# Patient Record
Sex: FEMALE | Race: WHITE | Employment: OTHER | URBAN - NONMETROPOLITAN AREA
[De-identification: names, ages, dates, MRNs, and addresses within clinical notes are randomized per-mention and may not be internally consistent; named-entity substitution may affect disease eponyms.]

---

## 2022-05-26 ENCOUNTER — OFFICE VISIT (OUTPATIENT)
Dept: PRIMARY CARE CLINIC | Age: 87
End: 2022-05-26
Payer: MEDICARE

## 2022-05-26 VITALS
SYSTOLIC BLOOD PRESSURE: 153 MMHG | WEIGHT: 140 LBS | OXYGEN SATURATION: 96 % | TEMPERATURE: 99.2 F | RESPIRATION RATE: 18 BRPM | HEART RATE: 92 BPM | DIASTOLIC BLOOD PRESSURE: 82 MMHG | BODY MASS INDEX: 23.32 KG/M2 | HEIGHT: 65 IN

## 2022-05-26 DIAGNOSIS — J40 BRONCHITIS WITH ACUTE WHEEZING: Primary | ICD-10-CM

## 2022-05-26 DIAGNOSIS — R05.9 COUGH: ICD-10-CM

## 2022-05-26 PROBLEM — I10 HYPERTENSION: Status: ACTIVE | Noted: 2022-05-26

## 2022-05-26 PROBLEM — J43.2 CENTRILOBULAR EMPHYSEMA (HCC): Status: ACTIVE | Noted: 2022-05-26

## 2022-05-26 PROBLEM — I25.10 ARTERIOSCLEROSIS OF CORONARY ARTERY: Status: ACTIVE | Noted: 2022-05-26

## 2022-05-26 PROBLEM — J30.1 ALLERGIC RHINITIS DUE TO POLLEN: Status: ACTIVE | Noted: 2022-05-26

## 2022-05-26 PROBLEM — J32.9 SINUSITIS: Status: ACTIVE | Noted: 2022-05-26

## 2022-05-26 PROBLEM — K27.9 PEPTIC ULCER: Status: ACTIVE | Noted: 2022-05-26

## 2022-05-26 PROBLEM — R91.8 MULTIPLE PULMONARY NODULES: Status: ACTIVE | Noted: 2022-05-26

## 2022-05-26 LAB
INFLUENZA A ANTIBODY: NEGATIVE
INFLUENZA B ANTIBODY: NEGATIVE

## 2022-05-26 PROCEDURE — 4004F PT TOBACCO SCREEN RCVD TLK: CPT | Performed by: NURSE PRACTITIONER

## 2022-05-26 PROCEDURE — 1123F ACP DISCUSS/DSCN MKR DOCD: CPT | Performed by: NURSE PRACTITIONER

## 2022-05-26 PROCEDURE — G8400 PT W/DXA NO RESULTS DOC: HCPCS | Performed by: NURSE PRACTITIONER

## 2022-05-26 PROCEDURE — 99202 OFFICE O/P NEW SF 15 MIN: CPT | Performed by: NURSE PRACTITIONER

## 2022-05-26 PROCEDURE — 87804 INFLUENZA ASSAY W/OPTIC: CPT | Performed by: NURSE PRACTITIONER

## 2022-05-26 PROCEDURE — 1090F PRES/ABSN URINE INCON ASSESS: CPT | Performed by: NURSE PRACTITIONER

## 2022-05-26 PROCEDURE — G8427 DOCREV CUR MEDS BY ELIG CLIN: HCPCS | Performed by: NURSE PRACTITIONER

## 2022-05-26 PROCEDURE — G8420 CALC BMI NORM PARAMETERS: HCPCS | Performed by: NURSE PRACTITIONER

## 2022-05-26 RX ORDER — PREDNISONE 20 MG/1
TABLET ORAL
Qty: 10 TABLET | Refills: 0 | Status: SHIPPED | OUTPATIENT
Start: 2022-05-26

## 2022-05-26 RX ORDER — DOXYCYCLINE HYCLATE 100 MG
100 TABLET ORAL 2 TIMES DAILY
Qty: 14 TABLET | Refills: 0 | Status: SHIPPED | OUTPATIENT
Start: 2022-05-26 | End: 2022-06-02

## 2022-05-26 RX ORDER — ONDANSETRON 4 MG/1
4 TABLET, FILM COATED ORAL
COMMUNITY
Start: 2020-04-06

## 2022-05-26 RX ORDER — CHLORTHALIDONE 25 MG/1
TABLET ORAL
COMMUNITY
Start: 2022-05-11

## 2022-05-26 RX ORDER — BENZONATATE 100 MG/1
100 CAPSULE ORAL 3 TIMES DAILY PRN
Qty: 21 CAPSULE | Refills: 0 | Status: SHIPPED | OUTPATIENT
Start: 2022-05-26 | End: 2022-06-02

## 2022-05-26 RX ORDER — LEVOTHYROXINE SODIUM 0.1 MG/1
TABLET ORAL
COMMUNITY
Start: 2022-04-26

## 2022-05-26 RX ORDER — OMEPRAZOLE 40 MG/1
40 CAPSULE, DELAYED RELEASE ORAL
COMMUNITY
Start: 2020-03-31

## 2022-05-26 RX ORDER — SUCRALFATE 1 G/1
TABLET ORAL
COMMUNITY
Start: 2022-03-10

## 2022-05-26 RX ORDER — CLOPIDOGREL BISULFATE 75 MG/1
TABLET ORAL
COMMUNITY
Start: 2022-03-10

## 2022-05-26 ASSESSMENT — ENCOUNTER SYMPTOMS
SHORTNESS OF BREATH: 0
DIARRHEA: 0
NAUSEA: 1
VOMITING: 1
COUGH: 1
RHINORRHEA: 1
WHEEZING: 0
SORE THROAT: 1

## 2022-05-26 NOTE — PATIENT INSTRUCTIONS
Patient Education        Bronchitis: Care Instructions  Your Care Instructions     Bronchitis is inflammation of the bronchial tubes, which carry air to the lungs. The tubes swell and produce mucus, or phlegm. The mucus and inflamedbronchial tubes make you cough. You may have trouble breathing. Most cases of bronchitis are caused by viruses like those that cause colds. Antibiotics usually do not help and they may be harmful. Bronchitis usually develops rapidly and lasts about 2 to 3 weeks in otherwisehealthy people. Follow-up care is a key part of your treatment and safety. Be sure to make and go to all appointments, and call your doctor if you are having problems. It's also a good idea to know your test results and keep alist of the medicines you take. How can you care for yourself at home?  Take all medicines exactly as prescribed. Call your doctor if you think you are having a problem with your medicine.  Get some extra rest.   Take an over-the-counter pain medicine, such as acetaminophen (Tylenol), ibuprofen (Advil, Motrin), or naproxen (Aleve) to reduce fever and relieve body aches. Read and follow all instructions on the label.  Do not take two or more pain medicines at the same time unless the doctor told you to. Many pain medicines have acetaminophen, which is Tylenol. Too much acetaminophen (Tylenol) can be harmful.  Take an over-the-counter cough medicine to help quiet a dry, hacking cough so that you can sleep. Avoid cough medicines that have more than one active ingredient. Read and follow all instructions on the label.  Do not smoke. Smoking can make bronchitis worse. If you need help quitting, talk to your doctor about stop-smoking programs and medicines. These can increase your chances of quitting for good. When should you call for help? Call 911 anytime you think you may need emergency care. For example, call if:     You have severe trouble breathing.    Call your doctor now or seek immediate medical care if:     You have new or worse trouble breathing.      You cough up dark brown or bloody mucus (sputum).      You have a new or higher fever.      You have a new rash. Watch closely for changes in your health, and be sure to contact your doctor if:     You cough more deeply or more often, especially if you notice more mucus or a change in the color of your mucus.      You are not getting better as expected. Where can you learn more? Go to https://[employer]. org and sign in to your [employer] account. Enter H333 in the Folk[employer] box to learn more about \"Bronchitis: Care Instructions. \"     If you do not have an account, please click on the \"Sign Up Now\" link. Current as of: July 6, 2021               Content Version: 13.2  © 2006-2022 Healthwise, Incorporated. Care instructions adapted under license by [facility] ([facility]). If you have questions about a medical condition or this instruction, always ask your healthcare professional. [address] any warranty or liability for your use of this information. Patient Education        doxycycline (oral/injection)  Pronunciation: DOX evelyne valente  Brand:  Acticlate, Adoxa, Alodox, Avidoxy, Doryx, Mondoxyne NL, Monodox, Morgidox,Okebo, Oracea, Oraxyl, Targadox, Vibramycin  What is the most important information I should know about doxycycline? You should not take this medicine if you are allergic to any tetracyclineantibiotic. Children younger than 6years old should use doxycycline only in cases of severe or life-threatening conditions. This medicine can cause permanentyellowing or graying of the teeth in children  Using doxycycline during pregnancy could harm the unborn baby or cause permanent tooth discoloration later in the baby's life. What is doxycycline?   Doxycycline is a tetracycline antibiotic that  Doxycycline is used to treat many different bacterial infections, such as acne, urinary tract infections, intestinal infections, eye infections, gonorrhea,chlamydia, periodontitis (gum disease), and others. Doxycycline is also used to treat blemishes, bumps, and acne-like lesionscaused by rosacea. Doxycycline will not treat facial redness caused by rosacea. Some forms of doxycycline are used to prevent malaria, to treat anthrax, or totreat infections caused by mites, ticks, or lice. Doxycycline may also be used for purposes not listed in this medication guide. What should I discuss with my healthcare provider before taking doxycycline? You should not take this medicine if you are allergic to doxycycline or other tetracycline antibiotics such as demeclocycline, minocycline, tetracycline, ortigecycline. Tell your doctor if you have ever had:   liver disease;   kidney disease;   asthma or sulfite allergy;   increased pressure inside your skull; or   if you also take isotretinoin, seizure medicine, or a blood thinner such as warfarin (Coumadin). If you are using doxycycline to treat gonorrhea, your doctor may test you to make sure you do not also have syphilis, anothersexually transmitted disease. Taking this medicine during pregnancy may affect tooth and bone development in the unborn baby. Taking doxycycline during the last half of pregnancy can cause permanent tooth discoloration later in the baby's life. Tell your doctor if you are pregnant orif you become pregnant. Doxycycline can make birth control pills less effective. Ask your doctor about using a non-hormonal birth control (condom, diaphragm with spermicide) toprevent pregnancy. Doxycycline can pass into breast milk and may affect bone and tooth development in a nursing infant. Do not breastfeed while you are taking doxycycline. Doxycycline can cause permanent yellowing or graying of the teeth in children younger than 6years old.  Children should use doxycycline only in cases of severe or life-threatening conditions such as anthrax or [facility] spotted fever. The benefit of treating a serious condition may outweigh any risks tothe child's tooth development. How should I take doxycycline? Follow all directions on your prescription label and read all medication guidesor instruction sheets. Use the medicine exactly as directed. Take doxycycline with a full glass of water. Drink plenty of liquids while youare taking doxycycline. Read and carefully follow any Instructions for Use provided with your medicine. Ask your doctor or pharmacist if you do not understand these instructions. Most brands of doxycyline may be taken with food or milk if the medicine upsetsyour stomach. Different brands of doxycycline may have different instructions about taking them with or without food. Take Oracea on an empty stomach, at least 1 hour before or 2 hours after a meal.  You may need to split a doxycycline tablet to get the correct dose. Follow yourdoctor's instructions. Swallow a delayed-release capsule or tablet whole. Do not crush, chew, break, or open it. Measure liquid medicine  with the dosing syringe provided, or with a special dose-measuring spoon or medicine cup. If you do not have a dose-measuring device, ask your pharmacistfor one. If you take doxycycline to prevent malaria: Start taking the medicine 1 or 2 days before entering an area where malaria is common. Continue taking the medicine every day during your stay and for atleast 4 weeks after you leave the area. Doxycycline is usually given by injection only if you are unable to take the medicine by mouth. A healthcare providerwill give you this injection as an infusion into a vein. Use this medicine for the full prescribed length of time, even if your symptoms quickly improve. Skipping doses can increase your risk of infection that is resistant to medication. Doxycycline will not treat a viral infection such asthe flu or a common cold.   Store at room temperature away from moisture, heat, and light. Throw away any unused medicine after the expiration date on the label has passed. Using  doxycycline can cause damage to your kidneys. What happens if I miss a dose? Take the medicine as soon as you can, but skip the missed dose if it is almost time for your next dose. Do not take two doses at one time. What happens if I overdose? Seek emergency medical attention or call the Poison Help line at 1-658.197.8379. What should I avoid while taking doxycycline? Do not take iron supplements, multivitamins, calcium supplements, antacids, orlaxatives within 2 hours before or after taking doxycycline. Avoid taking any other antibiotics with doxycycline unless your doctor has told you to. Doxycycline could make you sunburn more easily. Avoid sunlight or tanning beds. Wear protective clothing and use sunscreen (SPF 30 or higher) when you areoutdoors. Antibiotic medicines can cause diarrhea, which may be a sign of a new infection. If you have diarrhea that is watery or bloody, call your doctor. Do not use anti-diarrhea medicine unless your doctor tells you to. What are the possible side effects of doxycycline? Get emergency medical help if you have signs of an allergic reaction (hives, difficult breathing, swelling in your face or throat) or a severe skin reaction (fever, sore throat, burning in your eyes, skin pain, red or purple skin rashthat spreads and causes blistering and peeling). Seek medical treatment if you have a serious drug reaction that can affect many parts of your body. Symptoms may include: skin rash, fever, swollen glands, flu-like symptoms, muscle aches, severe weakness, unusual bruising, or yellowing of your skin oreyes. This reaction may occur several weeks after you began using doxycycline.   Call your doctor at once if you have:   severe stomach pain, diarrhea that is watery or bloody;   throat irritation, trouble swallowing;   chest pain, irregular heart rhythm, feeling short of breath;   little or no urination;   low white blood cell counts --fever, chills, swollen glands, body aches, weakness, pale skin, easy bruising or bleeding;   increased pressure inside the skull --severe headaches, ringing in your ears, dizziness, nausea, vision problems, pain behind your eyes; or   signs of liver or pancreas problems --loss of appetite, upper stomach pain (that may spread to your back), tiredness, nausea or vomiting, fast heart rate, dark urine, jaundice (yellowing of the skin or eyes). Common side effects may include:   nausea, vomiting, upset stomach, loss of appetite;   mild diarrhea;   skin rash or itching;   darkened skin color; or   vaginal itching or discharge. This is not a complete list of side effects and others may occur. Call your doctor for medical advice about side effects. You may report side effects toFDA at 7-479-SKU-1723. What other drugs will affect doxycycline? Sometimes it is not safe to use certain medications at the same time. Some drugs can affect your blood levels of other drugs you take, which mayincrease side effects or make the medications less effective. Other drugs may affect doxycycline, including prescription and over-the-counter medicines, vitamins, and herbal products. Tell your doctor about all yourcurrent medicines and any medicine you start or stop using. Where can I get more information? Your pharmacist can provide more information about doxycycline. Remember, keep this and all other medicines out of the reach of children, never share your medicines with others, and use this medication only for the indication prescribed. Every effort has been made to ensure that the information provided by [address] is accurate, up-to-date, and complete, but no guarantee is made to that effect. Drug information contained herein may be time sensitive.  [employer] information has been compiled for use by healthcare practitioners and consumers in the [place] and therefore [employer] does not warrant that uses outside of the [place] are appropriate, unless specifically indicated otherwise. [facility] drug information does not endorse drugs, diagnose patients or recommend therapy. [facility] drug information is an informational resource designed to assist licensed healthcare practitioners in caring for their patients and/or to serve consumers viewing this service as a supplement to, and not a substitute for, the expertise, skill, knowledge and judgment of healthcare practitioners. The absence of a warning for a given drug or drug combination in no way should be construed to indicate that the drug or drug combination is safe, effective or appropriate for any given patient. [facility] does not assume any responsibility for any aspect of healthcare administered with the aid of information [facility] provides. The information contained herein is not intended to cover all possible uses, directions, precautions, warnings, drug interactions, allergic reactions, or adverse effects. If you have questions about the drugs you are taking, check with yourdoctor, nurse or pharmacist.  Copyright 7451-1101 167 Delroy Mao: 21.04. Revision date:11/4/2020. Care instructions adapted under license by [facility] ([facility]). If you have questions about a medical condition or this instruction, always ask your healthcare professional. [place] [address] any warranty or liability for your use of this information. Patient Education        prednisone  Pronunciation: PRED ni sone  Brand: Isidro  What is the most important information I should know about prednisone? You should not use prednisone if you have a fungal infection anywhere in yourbody. You should not stop using prednisone suddenly. Follow your doctor's instructions about tapering your dose. What is prednisone?   Prednisone is a steroid that reduces inflammation in the body, and alsosuppresses your immune system. Prednisone is used to treat many different conditions such as hormonal disorders, skin diseases, arthritis, lupus, psoriasis, allergic conditions, ulcerative colitis, Crohn's disease, eye diseases, lung diseases, asthma, tuberculosis, blood cell disorders, kidney disorders, leukemia, lymphoma, multiple sclerosis, organ transplant rejection, swelling from a brain tumor orinjury. Prednisone may also be used for purposes not listed in this medication guide. What should I discuss with my healthcare provider before taking prednisone? You should not use prednisone if you are allergic to it, or if you have afungal infection anywhere in your body. Steroid medication can weaken your immune system, making it easier for you to get an infection or worsening an infection you already have. Tell your doctor about any illnessor infection you've had within the past several weeks. Tell your doctor if you have ever had:   heart problems, high blood pressure, or a heart attack;   glaucoma or cataracts;   herpes infection of the eyes;   past or present tuberculosis;   a parasite infection that causes diarrhea (such as threadworms);   any illness that causes diarrhea;   underactive thyroid;   diabetes;   a stomach ulcer, diverticulitis;   a colostomy or ileostomy;   osteoporosis or low bone mineral density (steroid medication can increase your risk of bone loss);   low levels of calcium or potassium in your blood;   cirrhosis or other liver disease;   mental illness or psychosis; or   a muscle disorder such as myasthenia gravis. Long-term use of steroids may lead to bone loss (osteoporosis), especially if you smoke or drink alcohol, if you do not exercise, or if you do not get enoughvitamin D or calcium in your diet. It is not known whether this medicine will harm an unborn baby. Tell yourdoctor if you are pregnant or plan to become pregnant.   You should not breastfeed while using prednisone. How should I take prednisone? Follow all directions on your prescription label and read all medication guides or instruction sheets. Your doctor may occasionally change your dose. Use themedicine exactly as directed. Prednisone is taken daily or every other day, depending on the condition being treated. You may need to take the medicine at a certain time of day. Follow your doctor's instructions about when and how often to take this medicine. Take with food if prednisone upsets your stomach. Measure liquid medicine carefully. Use the dosing syringe provided, or use a medicine dose-measuringdevice (not a kitchen spoon). Swallow the delayed-release tablet whole and do not crush, chew, or break it. Prednisone can weaken (suppress) your immune system, and you may get an infection more easily. Call your doctor if you have signs of infection (fever, weakness, cold or flu symptoms, skin sores, diarrhea, frequent or recurringillness). If you have major surgery or a severe injury or infection, your prednisone dose needs may change. Make sure any doctor caring for you knows you are using thismedicine. If you use this medicine long-term, you may need medical tests and vision exams. In case of emergency, wear or carry medical identification to let others knowyou use a steroid. You should not stop using prednisone suddenly. Follow your doctor's instructions about tapering your dose. Store at room temperature away from moisture, heat, and light. What happens if I miss a dose? Take the medicine as soon as you can, but skip the missed dose if it is almost time for your next dose. Do not take two doses at one time. What happens if I overdose? Seek emergency medical attention or call the Poison Help line at 1-795.805.8001.   High doses or long-term use of prednisone can lead to thinning skin, easy bruising, changes in body fat (especially in your face, neck, back, and waist), increased acne or facial hair, menstrualproblems, impotence, or loss of interest in sex. What should I avoid while taking prednisone? Do not receive a \"live\" vaccine while using prednisone. The vaccine may not work as well and may not fully protect you from disease. Live vaccines include measles, mumps, rubella (MMR), polio, rotavirus, typhoid, yellow fever, varicella (chickenpox), zoster (shingles), and nasal flu(influenza) vaccine. Avoid being near people who are sick or have infections. Call your doctor for preventive treatment if you are exposed to chickenpox or measles. These conditions can be serious or even fatal in people who are using steroidmedicine. Avoid drinking alcohol. What are the possible side effects of prednisone? Get emergency medical help if you have signs of an allergic reaction: hives; difficult breathing; swelling of your face, lips, tongue, or throat. Call your doctor at once if you have:   muscle pain or weakness;   blurred vision, tunnel vision, eye pain, or seeing halos around lights;   severe depression, changes in personality, unusual thoughts or behavior;   bloody or tarry stools, coughing up blood or vomit that looks like coffee grounds;   swelling, rapid weight gain, feeling short of breath;   irregular heartbeats;   severe headache, pounding in your neck or ears;   decreased adrenal gland hormones --muscle weakness, tiredness, diarrhea, nausea, menstrual changes, skin discoloration, craving salty foods, and feeling light-headed; or   low potassium level --leg cramps, constipation, irregular heartbeats, fluttering in your chest, increased thirst or urination, numbness or tingling, muscle weakness or limp feeling. Prednisone can affect growth in children. Tell your doctor if your child is notgrowing at a normal rate while using this medicine.   Common side effects may include:   weight gain (especially in your face or your upper back and torso);   increased appetite;   mood changes, trouble sleeping;   changes in your menstrual periods;   problems with memory or thought;   muscle or joint pain;   weakness;   headache, dizziness, spinning sensation;   nausea, bloating, loss of appetite;   slow wound healing; or   acne, increased sweating, thinning skin, bruising, pinpoint spots under your skin. This is not a complete list of side effects and others may occur. Call your doctor for medical advice about side effects. You may report side effects toFDA at 8-406-UDA-4422. What other drugs will affect prednisone? Sometimes it is not safe to use certain medications at the same time. Some drugs can affect your blood levels of other drugs you take, which mayincrease side effects or make the medications less effective. Tell your doctor about all your current medicines. Many drugs can affect prednisone, especially:   bupropion;   cyclosporine;   digoxin;   ketoconazole;   an antibiotic;   birth control pills or hormone replacement therapy;   a diuretic or \"water pill\";   insulin or oral diabetes medicine;   a blood thinner --warfarin, Coumadin, Jantoven; or   NSAIDs (nonsteroidal anti-inflammatory drugs) --aspirin, ibuprofen (Advil, Motrin), naproxen (Aleve), celecoxib, diclofenac, indomethacin, meloxicam, and others. This list is not complete and many other drugs may affect prednisone. This includes prescription and over-the-counter medicines, vitamins, andherbal products. Not all possible drug interactions are listed here. Where can I get more information? Your pharmacist can provide more information about prednisone. Remember, keep this and all other medicines out of the reach of children, never share your medicines with others, and use this medication only for the indication prescribed. Every effort has been made to ensure that the information provided by [address] is accurate, up-to-date, and complete, but no guarantee is made to that effect.  Drug information contained herein may be time sensitive. [employer] information has been compiled for use by healthcare practitioners and consumers in the [place] and therefore [employer] does not warrant that uses outside of the [place] are appropriate, unless specifically indicated otherwise. [employer] drug information does not endorse drugs, diagnose patients or recommend therapy. [facility] drug information is an informational resource designed to assist licensed healthcare practitioners in caring for their patients and/or to serve consumers viewing this service as a supplement to, and not a substitute for, the expertise, skill, knowledge and judgment of healthcare practitioners. The absence of a warning for a given drug or drug combination in no way should be construed to indicate that the drug or drug combination is safe, effective or appropriate for any given patient. [facility] does not assume any responsibility for any aspect of healthcare administered with the aid of information [facility] [employer] provides. The information contained herein is not intended to cover all possible uses, directions, precautions, warnings, drug interactions, allergic reactions, or adverse effects. If you have questions about the drugs you are taking, check with yourdoctor, nurse or pharmacist.  Copyright 1331-6694 [address]. Version: 10.01. Revision date:3/28/2019. Care instructions adapted under license by [facility] ([facility]). If you have questions about a medical condition or this instruction, always ask your healthcare professional. [place] [address] any warranty or liability for your use of this information. Patient Education        benzonatate  Pronunciation: goldie bowles  Brand: Tessalon Perles  What is the most important information I should know about benzonatate? Never suck or chew on a benzonatate capsule. Swallow the pill whole. Sucking or chewing the capsule may cause serious sideeffects.   Benzonatate is not approved for use by anyone younger than 8years old. An overdose of benzonatate can be fatal to a young child. What is benzonatate? Benzonatate is used to relieve coughing. Benzonatate is a non-narcotic cough medicine that numbs the throat and lungs,making the cough reflex less active. Benzonatate may also be used for purposes not listed in this medication guide. What should I discuss with my healthcare provider before taking benzonatate? You should not use this medicine if you are allergic to benzonatate or topical numbing medicines such as tetracaine or procaine (found in some insect bite andsunburn creams). Tell your doctor if you are pregnant or breastfeeding. Benzonatate is not approved for use by anyone younger than 8years old. An overdose of benzonatate can be fatal, especially to a young child who has accidentally swallowed the medicine. How should I take benzonatate? Follow all directions on your prescription label and read all medication guidesor instruction sheets. Use the medicine exactly as directed. Never suck or chew on a benzonatate capsule. Swallow the pill whole. Sucking or chewing the capsule may cause serious sideeffects. Store at room temperature away from moisture, heat, and light. What happens if I miss a dose? Skip the missed dose and use your next dose at the regular time. Do not use two doses at one time. What happens if I overdose? Seek emergency medical attention or call the Poison Help line at 1-350.981.9613. An overdose of benzonatate can be fatal, especially to a child. Accidental death has occurred in children under 8years old. Overdose symptoms may include tremors, feeling restless, seizure (convulsions),slow heart rate, weak pulse, fainting, and slow breathing (breathing may stop). What should I avoid while taking benzonatate? Avoid eating or drinking anything while you feel numbness or tingling in yourmouth or throat.   What are the possible side effects of benzonatate? Stop taking this medicine and get emergency medical help if you have signs of an allergic reaction: hives; difficult breathing; swelling of your face, lips, tongue, or throat. Call your doctor at once if you have:   severe drowsiness or dizziness;   confusion, hallucinations.  ongoing numbness or tingling in your mouth, throat, or face;   numbness in your chest;   a choking feeling;   chills; or   burning in your eyes. Some of these side effects may result from chewing or sucking on a benzonatate capsule. Common side effects may include:   headache, dizziness;   nausea, upset stomach;   constipation;   itching, rash; or   stuffy nose. This is not a complete list of side effects and others may occur. Call your doctor for medical advice about side effects. You may report side effects toFDA at 3-704-LGX-6642. What other drugs will affect benzonatate? Using benzonatate with other drugs that make you drowsy can worsen this effect. Ask your doctor before using opioid medication, a sleeping pill, a musclerelaxer, or medicine for anxiety or seizures. Other drugs may affect benzonatate, including prescription and over-the-counter medicines, vitamins, and herbal products. Tell your doctor about all yourcurrent medicines and any medicine you start or stop using. Where can I get more information? Your pharmacist can provide more information about benzonatate. Remember, keep this and all other medicines out of the reach of children, never share your medicines with others, and use this medication only for the indication prescribed. Every effort has been made to ensure that the information provided by [address] is accurate, up-to-date, and complete, but no guarantee is made to that effect. Drug information contained herein may be time sensitive.  [employer] information has been compiled for use by healthcare practitioners and consumers in the [place] and therefore [employer] does not warrant that uses outside of the [place] are appropriate, unless specifically indicated otherwise. [employer] drug information does not endorse drugs, diagnose patients or recommend therapy. [employer] drug information is an informational resource designed to assist licensed healthcare practitioners in caring for their patients and/or to serve consumers viewing this service as a supplement to, and not a substitute for, the expertise, skill, knowledge and judgment of healthcare practitioners. The absence of a warning for a given drug or drug combination in no way should be construed to indicate that the drug or drug combination is safe, effective or appropriate for any given patient. [facility] does not assume any responsibility for any aspect of healthcare administered with the aid of information [facility] provides. The information contained herein is not intended to cover all possible uses, directions, precautions, warnings, drug interactions, allergic reactions, or adverse effects. If you have questions about the drugs you are taking, check with yourdoctor, nurse or pharmacist.  Copyright 3252-3952 [address]. Version: 9.01. Revision date:10/10/2019. Care instructions adapted under license by [employer]. If you have questions about a medical condition or this instruction, always ask your healthcare professional. [place] [address] any warranty or liability for your use of this information. · Tessalon Perles as prescribed as needed for cough: swallow whole, no more than 3 doses in 24 hours and do not take with other cough meds. · Antibiotic as directed. Take until all doses are completed. · Probiotic or greek yogurt daily while on antibiotic. · Prednisone 40 mg daily x 5 days. Complete all doses as prescribed. Denies history of impaired liver function, diabetes, CHF, systemic fungal infection, osteoporosis, or glaucoma. Take with food at same time each day. Take the

## 2022-05-26 NOTE — PROGRESS NOTES
[facility] W[facility]  [address] [place] [address]    [facility] [address]  Dept: [phone]  Dept Fax: [phone]    Edna Santos is a 81 y.o. female who presents to the [facility] in Care today for hermedical conditions/complaints as noted below. Edna Santos is c/o of URI (Started yesterday-sore throat, cough, chills, Nausea, headache, bodyaches, congestion, runny nose)      HPI:     URI   This is a new problem. The current episode started yesterday (Started yesterday with productive cough, runny nose, nasal congestion, chills, headaches, body aches and nausea. Denies known exposure to Covid-19 or influenza. Has received Covid-19 vaccines. ). The problem has been gradually worsening (Did home Covid-19 test and was negative.  is currently admitted to [facility] for bowel obstruction. Visiting from [place],). There has been no fever. Associated symptoms include congestion, coughing, headaches, nausea, rhinorrhea, a sore throat and vomiting (x 1 time a little bit). Pertinent negatives include no diarrhea, ear pain, rash or wheezing. Associated symptoms comments: Body aches and chills. . She has tried antihistamine and acetaminophen (Chloraseptic and Tylenol, Benadryl) for the symptoms. The treatment provided mild relief. History reviewed. No pertinent past medical history.      Current Outpatient Medications   Medication Sig Dispense Refill    sucralfate (CARAFATE) 1 GM tablet       ondansetron (ZOFRAN) 4 MG tablet Take 4 mg by mouth      omeprazole (PRILOSEC) 40 MG delayed release capsule Take 40 mg by mouth      levothyroxine (SYNTHROID) 100 MCG tablet       clopidogrel (PLAVIX) 75 MG tablet       chlorthalidone (HYGROTON) 25 MG tablet TAKE 1 TABLET BY MOUTH EVERY DAY      Metoprolol Succinate 25 MG CS24 Take 25 mg by mouth daily      doxycycline hyclate (VIBRA-TABS) 100 MG tablet Take 1 tablet by mouth 2 times daily for 7 days 14 tablet 0    predniSONE (DELTASONE) 20 MG tablet Take 2 tablets by mouth daily x 5 days. Take with food. 10 tablet 0    benzonatate (TESSALON PERLES) 100 MG capsule Take 1 capsule by mouth 3 times daily as needed for Cough (Swallow whole. No more than 3 doses in 24 hrs.) 21 capsule 0     No current facility-administered medications for this visit. No Known Allergies    :     Review of Systems   Constitutional: Positive for chills and fatigue. Negative for appetite change, diaphoresis and fever. HENT: Positive for congestion, rhinorrhea and sore throat. Negative for ear pain. Respiratory: Positive for cough. Negative for shortness of breath and wheezing. Gastrointestinal: Positive for nausea and vomiting (x 1 time a little bit). Negative for diarrhea. Musculoskeletal: Positive for myalgias. Skin: Negative for rash and wound. Neurological: Positive for light-headedness (When getting up.) and headaches. Negative for dizziness.       :     Physical Exam  Vitals and nursing note reviewed. Constitutional:       General: She is not in acute distress. Appearance: Normal appearance. She is well-developed. She is not ill-appearing or diaphoretic. Comments: Well hydrated, nontoxic appearance. HENT:      Head: Normocephalic and atraumatic. Right Ear: Hearing, tympanic membrane, ear canal and external ear normal. No middle ear effusion. No mastoid tenderness. Tympanic membrane is not injected, erythematous or bulging. Left Ear: Hearing, tympanic membrane, ear canal and external ear normal.  No middle ear effusion. No mastoid tenderness. Tympanic membrane is not injected, erythematous or bulging. Nose: Mucosal edema, congestion and rhinorrhea present. Rhinorrhea is clear. Right Sinus: No maxillary sinus tenderness or frontal sinus tenderness. Left Sinus: No maxillary sinus tenderness or frontal sinus tenderness. Mouth/Throat:      Lips: Pink. Mouth: Mucous membranes are moist.      Pharynx: Oropharynx is clear. Uvula midline. No pharyngeal swelling, oropharyngeal exudate, posterior oropharyngeal erythema or uvula swelling. Eyes:      General: No scleral icterus. Right eye: No discharge. Left eye: No discharge. Conjunctiva/sclera: Conjunctivae normal.      Pupils: Pupils are equal, round, and reactive to light. Cardiovascular:      Rate and Rhythm: Normal rate and regular rhythm. Heart sounds: Normal heart sounds, S1 normal and S2 normal. No murmur heard. No friction rub. No gallop. Pulmonary:      Effort: Pulmonary effort is normal. No tachypnea, bradypnea, accessory muscle usage or respiratory distress. Breath sounds: Normal air entry. Examination of the right-upper field reveals wheezing. Examination of the left-upper field reveals wheezing. Wheezing present. No decreased breath sounds, rhonchi or rales. Comments: Harsh, moist, productive cough that is worse with deep inspiration. Breath sounds with expiratory wheezes B/L upper anterior and posterior lobes that clear with coughing. Chest expansion symmetrical.  No audible wheezing or respiratory distress. No rales or rhonchi. Abdominal:      General: Bowel sounds are normal.      Palpations: Abdomen is soft. Tenderness: There is no abdominal tenderness. Musculoskeletal:         General: Normal range of motion. Lymphadenopathy:      Cervical: No cervical adenopathy. Right cervical: No superficial or posterior cervical adenopathy. Left cervical: No superficial or posterior cervical adenopathy. Skin:     General: Skin is warm and dry. Coloration: Skin is not pale. Findings: No erythema or rash. Neurological:      Mental Status: She is alert and oriented to person, place, and time. Psychiatric:         Behavior: Behavior normal. Behavior is cooperative.        BP (!) 153/82 (Site: Left Upper Arm, Position: Sitting, Cuff Size: Medium Adult)   Pulse 92   Temp 99.2 °F (37.3 °C) (Oral)   Resp 18   Ht 5' 5\" (1.651 m)   Wt 140 lb (63.5 kg)   SpO2 96%   BMI 23.30 kg/m²     Results for orders placed or performed in visit on 05/26/22   POCT Influenza A/B   Result Value Ref Range    Influenza A Ab Negative     Influenza B Ab Negative        :      Diagnosis Orders   1. Bronchitis with acute wheezing  doxycycline hyclate (VIBRA-TABS) 100 MG tablet    predniSONE (DELTASONE) 20 MG tablet    benzonatate (TESSALON PERLES) 100 MG capsule   2. Cough  POCT Influenza A/B       :      Return if symptoms worsen or fail to improve, for Resume all previous medications as directed. Orders Placed This Encounter   Medications    doxycycline hyclate (VIBRA-TABS) 100 MG tablet     Sig: Take 1 tablet by mouth 2 times daily for 7 days     Dispense:  14 tablet     Refill:  0    predniSONE (DELTASONE) 20 MG tablet     Sig: Take 2 tablets by mouth daily x 5 days. Take with food. Dispense:  10 tablet     Refill:  0    benzonatate (TESSALON PERLES) 100 MG capsule     Sig: Take 1 capsule by mouth 3 times daily as needed for Cough (Swallow whole. No more than 3 doses in 24 hrs.)     Dispense:  21 capsule     Refill:  0      · Tessalon Perles as prescribed as needed for cough: swallow whole, no more than 3 doses in 24 hours and do not take with other cough meds. · Antibiotic as directed. Take until all doses are completed. · Probiotic or greek yogurt daily while on antibiotic. · Prednisone 40 mg daily x 5 days. Complete all doses as prescribed. Denies history of impaired liver function, diabetes, CHF, systemic fungal infection, osteoporosis, or glaucoma. Take with food at same time each day. Take the prednisone as directed on the prescription. Prednisone is very bitter so swallow tablets quickly to prevent dissolving in mouth. After taking, don't lay down for 2 hours to help prevent reflux.   · Practice meticulous handwashing and cover cough to prevent spread of infection  · Encouraged to increase fluids and rest  · Ibuprofen/Tylenol OTC PRN for pain, discomfort or fever as directed on package. Take with food. · Cool mist humidifier  · Hot tea with honey and lemon for cough PRN  · Patient instructions given for acute bronchitis, Doxycycline, prednisone and tessalon perles. · To ER or call 911 if any difficulty breathing, shortness of breath, inability to swallow, hives, rash, facial/tongue swelling or temp greater than 103 degrees. · Follow up with PCP or Walk in Care as needed if symptoms worsen or do not improve. Cara received counseling on the following healthy behaviors: medication adherence. Patient given educational materials - see patient instructions. Discussed use, benefit, and side effects of prescribed medications. Treatment plan discussed at visit. Continue routine health care follow up. All patient questions answered. Pt voiced understanding.       Electronically signed by LENORA Johnson CNP on 5/26/2022 at 2:52 PM

## 2022-05-27 ENCOUNTER — HOSPITAL ENCOUNTER (EMERGENCY)
Age: 87
Discharge: HOME | End: 2022-05-28
Attending: FAMILY MEDICINE
Payer: MEDICARE

## 2022-05-27 DIAGNOSIS — I16.0 HYPERTENSIVE URGENCY: Primary | ICD-10-CM

## 2022-05-27 LAB
ABSOLUTE EOS #: 0 K/UL (ref 0–0.4)
ABSOLUTE LYMPH #: 1.7 K/UL (ref 1–4.8)
ABSOLUTE MONO #: 1.4 K/UL (ref 0–1)
ALBUMIN SERPL-MCNC: 4.4 G/DL (ref 3.5–5.2)
ALP BLD-CCNC: 92 U/L (ref 35–104)
ALT SERPL-CCNC: 17 U/L (ref 5–33)
ANION GAP SERPL CALCULATED.3IONS-SCNC: 14 MMOL/L (ref 9–17)
AST SERPL-CCNC: 20 U/L
BASOPHILS # BLD: 0 % (ref 0–2)
BASOPHILS ABSOLUTE: 0 K/UL (ref 0–0.2)
BILIRUB SERPL-MCNC: 0.42 MG/DL (ref 0.3–1.2)
BUN BLDV-MCNC: 30 MG/DL (ref 8–23)
BUN/CREAT BLD: 39 (ref 9–20)
CALCIUM SERPL-MCNC: 9.8 MG/DL (ref 8.6–10.4)
CHLORIDE BLD-SCNC: 91 MMOL/L (ref 98–107)
CO2: 28 MMOL/L (ref 20–31)
CREAT SERPL-MCNC: 0.76 MG/DL (ref 0.5–0.9)
DIFFERENTIAL TYPE: YES
EOSINOPHILS RELATIVE PERCENT: 0 % (ref 0–5)
GFR AFRICAN AMERICAN: >60 ML/MIN
GFR NON-AFRICAN AMERICAN: >60 ML/MIN
GFR SERPL CREATININE-BSD FRML MDRD: ABNORMAL ML/MIN/{1.73_M2}
GLUCOSE BLD-MCNC: 125 MG/DL (ref 70–99)
HCT VFR BLD CALC: 41.9 % (ref 36–46)
HEMOGLOBIN: 13.8 G/DL (ref 12–16)
LACTATE DEHYDROGENASE: 205 U/L (ref 135–214)
LYMPHOCYTES # BLD: 18 % (ref 15–40)
MCH RBC QN AUTO: 30.2 PG (ref 26–34)
MCHC RBC AUTO-ENTMCNC: 33 G/DL (ref 31–37)
MCV RBC AUTO: 91.4 FL (ref 80–100)
MONOCYTES # BLD: 15 % (ref 4–8)
PDW BLD-RTO: 14.4 % (ref 12.1–15.2)
PLATELET # BLD: 271 K/UL (ref 140–450)
POTASSIUM SERPL-SCNC: 3.7 MMOL/L (ref 3.7–5.3)
RBC # BLD: 4.58 M/UL (ref 4–5.2)
SEG NEUTROPHILS: 67 % (ref 47–75)
SEGMENTED NEUTROPHILS ABSOLUTE COUNT: 6.2 K/UL (ref 2.5–7)
SODIUM BLD-SCNC: 133 MMOL/L (ref 135–144)
TOTAL PROTEIN: 7 G/DL (ref 6.4–8.3)
TROPONIN, HIGH SENSITIVITY: 16 NG/L (ref 0–14)
WBC # BLD: 9.2 K/UL (ref 3.5–11)

## 2022-05-27 PROCEDURE — 96376 TX/PRO/DX INJ SAME DRUG ADON: CPT

## 2022-05-27 PROCEDURE — 99284 EMERGENCY DEPT VISIT MOD MDM: CPT

## 2022-05-27 PROCEDURE — 83615 LACTATE (LD) (LDH) ENZYME: CPT

## 2022-05-27 PROCEDURE — 96375 TX/PRO/DX INJ NEW DRUG ADDON: CPT

## 2022-05-27 PROCEDURE — 85025 COMPLETE CBC W/AUTO DIFF WBC: CPT

## 2022-05-27 PROCEDURE — 84484 ASSAY OF TROPONIN QUANT: CPT

## 2022-05-27 PROCEDURE — 80053 COMPREHEN METABOLIC PANEL: CPT

## 2022-05-27 PROCEDURE — 93005 ELECTROCARDIOGRAM TRACING: CPT | Performed by: FAMILY MEDICINE

## 2022-05-27 PROCEDURE — 96374 THER/PROPH/DIAG INJ IV PUSH: CPT

## 2022-05-27 PROCEDURE — 2500000003 HC RX 250 WO HCPCS: Performed by: FAMILY MEDICINE

## 2022-05-27 RX ORDER — LABETALOL HYDROCHLORIDE 5 MG/ML
10 INJECTION, SOLUTION INTRAVENOUS ONCE
Status: COMPLETED | OUTPATIENT
Start: 2022-05-27 | End: 2022-05-27

## 2022-05-27 RX ORDER — ENALAPRILAT 2.5 MG/2ML
0.62 INJECTION INTRAVENOUS ONCE
Status: COMPLETED | OUTPATIENT
Start: 2022-05-27 | End: 2022-05-28

## 2022-05-27 RX ORDER — LISINOPRIL 10 MG/1
10 TABLET ORAL DAILY
Qty: 30 TABLET | Refills: 1 | Status: SHIPPED | OUTPATIENT
Start: 2022-05-27

## 2022-05-27 RX ADMIN — LABETALOL HYDROCHLORIDE 10 MG: 5 INJECTION, SOLUTION INTRAVENOUS at 21:43

## 2022-05-27 RX ADMIN — LABETALOL HYDROCHLORIDE 10 MG: 5 INJECTION, SOLUTION INTRAVENOUS at 21:05

## 2022-05-28 VITALS
OXYGEN SATURATION: 96 % | SYSTOLIC BLOOD PRESSURE: 167 MMHG | HEART RATE: 62 BPM | BODY MASS INDEX: 24.38 KG/M2 | TEMPERATURE: 97.2 F | WEIGHT: 146.5 LBS | DIASTOLIC BLOOD PRESSURE: 75 MMHG | RESPIRATION RATE: 16 BRPM

## 2022-05-28 LAB
EKG ATRIAL RATE: 63 BPM
EKG P AXIS: 99 DEGREES
EKG P-R INTERVAL: 150 MS
EKG Q-T INTERVAL: 426 MS
EKG QRS DURATION: 88 MS
EKG QTC CALCULATION (BAZETT): 435 MS
EKG R AXIS: 74 DEGREES
EKG T AXIS: 95 DEGREES
EKG VENTRICULAR RATE: 63 BPM

## 2022-05-28 PROCEDURE — 93010 ELECTROCARDIOGRAM REPORT: CPT | Performed by: INTERNAL MEDICINE

## 2022-05-28 PROCEDURE — 2500000003 HC RX 250 WO HCPCS: Performed by: FAMILY MEDICINE

## 2022-05-28 RX ADMIN — ENALAPRILAT 0.62 MG: 1.25 INJECTION INTRAVENOUS at 00:09

## 2022-05-28 NOTE — FLOWSHEET NOTE
Patient states she has been under a lot of stress from husbands surgery, and took her bp meds, and home bp and was concerned because she has a history of cardiac stents.

## 2022-05-28 NOTE — ED PROVIDER NOTES
[facility]  eMERGENCY dEPARTMENT eNCOUnter          [address] [facility]       Chief Complaint   Patient presents with    Hypertension     Pt C/O elevated blood pressure today. Nurses Notes reviewed and I agree except as noted in the HPI. HISTORY OF PRESENT ILLNESS    Edna Santos is a 81 y.o. female who presents to the emergency room via private vehicle, patient noted elevated blood pressure, states she was at home and she noticed that her face and forehead appeared red, asked her niece to come from next-door take her blood pressure found to be 197/90, later 187/98, and 30 minutes later 205/100. Patient does analogies been under increased stress at home, as her  and her are visiting from out of state, her  was found to have colon cancer and decided to have surgery appear, is currently hospitalized in [place]. Patient states she does take her blood pressure medications in the morning and is been compliant. Patient denies any headache lightheadedness dizziness denies any change in vision denies any tinnitus denies any chest pain or palpitations denies difficulty breathing. REVIEW OF SYSTEMS     Review of Systems   All other systems reviewed and are negative. PAST MEDICAL HISTORY    has a past medical history of Hypertension. SURGICAL HISTORY      has a past surgical history that includes Cardiac catheterization and Hysterectomy.     CURRENT MEDICATIONS       Discharge Medication List as of 5/27/2022 11:41 PM      CONTINUE these medications which have NOT CHANGED    Details   sucralfate (CARAFATE) 1 GM tablet Historical Med      ondansetron (ZOFRAN) 4 MG tablet Take 4 mg by mouthHistorical Med      omeprazole (PRILOSEC) 40 MG delayed release capsule Take 40 mg by mouthHistorical Med      levothyroxine (SYNTHROID) 100 MCG tablet Historical Med      clopidogrel (PLAVIX) 75 MG tablet Historical Med      chlorthalidone (HYGROTON) 25 MG tablet TAKE 1 TABLET BY MOUTH EVERY DAYHistorical Med      Metoprolol Succinate 25 MG CS24 Take 25 mg by mouth dailyHistorical Med      doxycycline hyclate (VIBRA-TABS) 100 MG tablet Take 1 tablet by mouth 2 times daily for 7 days, Disp-14 tablet, R-0Normal      predniSONE (DELTASONE) 20 MG tablet Take 2 tablets by mouth daily x 5 days. Take with food. , Disp-10 tablet, R-0Normal      benzonatate (TESSALON PERLES) 100 MG capsule Take 1 capsule by mouth 3 times daily as needed for Cough (Swallow whole. No more than 3 doses in 24 hrs.), Disp-21 capsule, R-0Normal             ALLERGIES     has No Known Allergies. FAMILY HISTORY     has no family status information on file. family history is not on file. SOCIAL HISTORY      reports that she has never smoked. She has never used smokeless tobacco.    PHYSICAL EXAM     INITIAL VITALS:  weight is 146 lb 8 oz (66.5 kg). Her oral temperature is 97.2 °F (36.2 °C). Her blood pressure is 167/75 (abnormal) and her pulse is 62. Her respiration is 16 and oxygen saturation is 96%. Physical Exam   Constitutional: Patient is oriented to person, place, and time. Patient appears well-developed and well-nourished. Patient is active and cooperative. HENT:   Head: Normocephalic and atraumatic. Head is without contusion. Right Ear: Hearing and external ear normal. No drainage. Left Ear: Hearing and external ear normal. No drainage. Nose: Nose normal. No nasal deformity. No epistaxis. Mouth/Throat: Mucous membranes are not dry. Eyes: EOMI. Conjunctivae, sclera, and lids are normal. Right eye exhibits no discharge. Left eye exhibits no discharge. Neck: Full passive range of motion without pain and phonation normal.  Negative JVD  Cardiovascular:  Normal rate, regular rhythm and intact distal pulses. No lower extremity edema. Pulses: Right radial pulse  2+   Pulmonary/Chest: Effort normal. No tachypnea and no bradypnea. No wheezes, rhonchi, or rales.   Abdominal: BMI 24.3, 192/75 (!) 167/75   Pulse: 59 60 60 62   Resp: 19 19 15 16   Temp:       TempSrc:       SpO2: 96% 97% 96% 96%   Weight:         Patient history and physical exam taken at bedside, discussed patient symptoms and exam findings, discussed initial work-up to include EKG, blood work, IV access, will give IV labetalol 10 mg, patient placed on cardiac monitor, pulse ox, sitting high semi-[employer]'s in bed. EKG  as above    Initial lab work-up reviewed    Discussed with patient initial work-up, do not blood pressure remains elevated, will give second dose labetalol        Patient blood pressure still some elevation, enalaprilat 0.65 mg IV x1 ordered    Discussed with patient overall work-up, discussed her elevated blood pressure, I do note patient stressors with  recent diagnosis: Cancer and major surgery, reviewed patient's current medications, given I would not be able to contact patient's PCP was out of state on a Friday evening on a holiday weekend, would not be able to discuss patient's medications, however I would like to add lisinopril 10 mg patient's current regimen for better blood pressure control, would advise patient to keep an eye on her blood pressure at home, close follow-up with her established primary care, return to ER if any symptoms change worse other concerns, acknowledged        FINAL IMPRESSION      1.  Hypertensive urgency          DISPOSITION/PLAN   D/c    PATIENT REFERRED TO:  Follow-up with your established primary care    Call       [facility]OSP [facility] ED  [address] [facility] [address] [phone]    As needed, If symptoms worsen      DISCHARGE MEDICATIONS:  Discharge Medication List as of 5/27/2022 11:41 PM      START taking these medications    Details   lisinopril (PRINIVIL;ZESTRIL) 10 MG tablet Take 1 tablet by mouth daily, Disp-30 tablet, R-1Normal                 Summation      Patient Course:  D/c    ED Medications administered this visit:    Medications labetalol (NORMODYNE;TRANDATE) injection 10 mg (10 mg IntraVENous Given 5/27/22 2105)   labetalol (NORMODYNE;TRANDATE) injection 10 mg (10 mg IntraVENous Given 5/27/22 2143)   enalaprilat (VASOTEC) injection 0.625 mg (0.625 mg IntraVENous Given 5/28/22 0009)       New Prescriptions from this visit:    Discharge Medication List as of 5/27/2022 11:41 PM      START taking these medications    Details   lisinopril (PRINIVIL;ZESTRIL) 10 MG tablet Take 1 tablet by mouth daily, Disp-30 tablet, R-1Normal             Follow-up:  Follow-up with your established primary care    Call       HOSP [facility] ED  [address] [place] [address]  [phone]    As needed, If symptoms worsen        Final Impression:   1.  Hypertensive urgency               (Please note that portions of this note were completed with a voice recognition program.  Efforts were made to edit the dictations but occasionally words are mis-transcribed.)    Armond Denver, MD Armond Denver, MD  05/28/22 1583

## 2022-06-25 PROBLEM — J32.9 SINUSITIS: Status: RESOLVED | Noted: 2022-05-26 | Resolved: 2022-06-25
